# Patient Record
Sex: FEMALE | Race: WHITE | Employment: PART TIME | URBAN - METROPOLITAN AREA
[De-identification: names, ages, dates, MRNs, and addresses within clinical notes are randomized per-mention and may not be internally consistent; named-entity substitution may affect disease eponyms.]

---

## 2017-06-22 PROBLEM — I51.89 DIASTOLIC DYSFUNCTION: Status: ACTIVE | Noted: 2017-06-22

## 2017-06-22 PROBLEM — I10 ESSENTIAL HYPERTENSION: Status: ACTIVE | Noted: 2017-06-22

## 2017-06-22 PROBLEM — I25.10 CORONARY ARTERY DISEASE INVOLVING NATIVE CORONARY ARTERY OF NATIVE HEART WITHOUT ANGINA PECTORIS: Status: ACTIVE | Noted: 2017-06-22

## 2017-06-22 PROBLEM — R06.02 SHORTNESS OF BREATH: Status: ACTIVE | Noted: 2017-06-22

## 2017-07-06 PROBLEM — M79.604 PAIN IN BOTH LOWER EXTREMITIES: Status: ACTIVE | Noted: 2017-07-06

## 2017-07-06 PROBLEM — M79.605 PAIN IN BOTH LOWER EXTREMITIES: Status: ACTIVE | Noted: 2017-07-06

## 2017-07-10 ENCOUNTER — TELEPHONE (OUTPATIENT)
Dept: NEUROLOGY | Facility: CLINIC | Age: 67
End: 2017-07-10

## 2017-07-10 DIAGNOSIS — D32.9 MENINGIOMA (HCC): ICD-10-CM

## 2017-07-10 DIAGNOSIS — G43.009 MIGRAINE WITHOUT AURA AND WITHOUT STATUS MIGRAINOSUS, NOT INTRACTABLE: Primary | ICD-10-CM

## 2017-07-10 NOTE — TELEPHONE ENCOUNTER
Patient is having MRI Brain with and without contrast at [facility]. Was told she needed to have blood work done beforehand. Was unsure what she needed to complete and did not have the number she called.      Spoke with radiology, states that she

## 2017-07-13 NOTE — TELEPHONE ENCOUNTER
Patient called back on status of below. Informed her that orders were faxed, verbalized understanding.

## 2017-07-19 ENCOUNTER — TELEPHONE (OUTPATIENT)
Dept: NEUROLOGY | Facility: CLINIC | Age: 67
End: 2017-07-19

## 2017-08-01 ENCOUNTER — TELEPHONE (OUTPATIENT)
Dept: NEUROLOGY | Facility: CLINIC | Age: 67
End: 2017-08-01

## 2017-08-01 NOTE — TELEPHONE ENCOUNTER
Per Yanet Cote, they are unable to do MRI with contrast due to low GFR. Benitez Mayers not available to consult if she would want MRI done without contrast only.  Advised cancelling appointment today and we will check with Dr Frederick Mayers when she return

## 2017-08-02 ENCOUNTER — TELEPHONE (OUTPATIENT)
Dept: NEUROLOGY | Facility: CLINIC | Age: 67
End: 2017-08-02

## 2017-08-07 NOTE — TELEPHONE ENCOUNTER
Pt called back, stating she was in a bad place to take calls before. Re-explained Dr. Car Yarbrough conversation. Requested that she get the MRI films and bring them to the office to upload, so that doctor can compare the old and new MRIs side by side.   She

## 2017-08-07 NOTE — TELEPHONE ENCOUNTER
Per Dr. Angela Lara, negative study. No concerning findings. Left detailed message on VM (ok per HIPAA) relaying above results. Encouraged call back with any questions/ concerns. MRI report sent for scanning.

## 2017-08-07 NOTE — TELEPHONE ENCOUNTER
Pt is calling back with questions regarding MRI results. She states that her last MRI showed meningiomas, and she wanted to know if they had gotten any bigger. Was able to pull MRI results from scanning folder.   Reviewed with her that there was no mentio

## 2017-08-07 NOTE — TELEPHONE ENCOUNTER
Called the patient and told her about the MRI brain results that we received from [facility]. They did not mentioned anything about the calcified skull lesions.  Report says no acute intracranial abnormality, calcifications of falx cerebri and ch

## 2017-08-08 ENCOUNTER — TELEPHONE (OUTPATIENT)
Dept: NEUROLOGY | Facility: CLINIC | Age: 67
End: 2017-08-08

## 2017-08-08 NOTE — TELEPHONE ENCOUNTER
Note per Epic that patient spoke to this RN twice and Dr. Jayesh De Paz once yesterday. Called patient back and she states that she had a colonoscopy yesterday and she was Anne Bob out of it\" when she talked with us, and didn't remember the conversations.     R

## 2017-08-29 PROBLEM — I87.2 VENOUS INSUFFICIENCY (CHRONIC) (PERIPHERAL): Status: ACTIVE | Noted: 2017-08-29

## 2018-08-14 PROBLEM — Z01.818 PREOPERATIVE CLEARANCE: Status: ACTIVE | Noted: 2018-08-14

## 2020-09-26 ENCOUNTER — ORDER TRANSCRIPTION (OUTPATIENT)
Dept: ADMINISTRATIVE | Facility: HOSPITAL | Age: 70
End: 2020-09-26

## 2020-09-26 DIAGNOSIS — S52.532A CLOSED COLLES' FRACTURE OF LEFT RADIUS, INITIAL ENCOUNTER: Primary | ICD-10-CM

## 2020-11-02 ENCOUNTER — OFFICE VISIT (OUTPATIENT)
Dept: ELECTROPHYSIOLOGY | Facility: HOSPITAL | Age: 70
End: 2020-11-02
Attending: ORTHOPAEDIC SURGERY
Payer: MEDICARE

## 2020-11-02 DIAGNOSIS — S52.532A CLOSED COLLES' FRACTURE OF LEFT RADIUS, INITIAL ENCOUNTER: ICD-10-CM

## 2020-11-02 PROCEDURE — 95910 NRV CNDJ TEST 7-8 STUDIES: CPT | Performed by: OTHER

## 2020-11-02 PROCEDURE — 95886 MUSC TEST DONE W/N TEST COMP: CPT | Performed by: OTHER

## 2020-11-02 NOTE — PROCEDURES
[address] [place] [address]  [place], [place] [facility]  [phone]            Patient: Laurita Andrea Sex: Female  Patient ID: [id number] YOB: 1950      Visit Date: 11/2/2020 10:55  Patient Age On Visit Date Median - APB 25.63 26.25 ?32.00   Ulnar - ADM 25.89  ?32.00             Needle EMG (Upper limb)         EMG Summary Table     Spontaneous MUAP Recruitment   Muscle Nerve Roots IA Fib PSW Fasc H.F. Amp Dur. PPP Pattern   L.  Deltoid Axillary C5-C6 N None Non

## 2021-06-08 PROBLEM — F90.0 ATTENTION DEFICIT HYPERACTIVITY DISORDER (ADHD), PREDOMINANTLY INATTENTIVE TYPE: Status: ACTIVE | Noted: 2021-06-08

## (undated) NOTE — LETTER
8/10/2017        [address] [facility]      Dear Toni Plummer,     We are contacting you from Dr. Delgado Segura office. Your health is important to us.  We have not received test results for additional tests that your provider r

## (undated) NOTE — MR AVS SNAPSHOT
After Visit Summary   11/2/2020    Mendel Pleva    MRN: [id number]           Visit Information     Date & Time  11/2/2020 10:00 AM Provider  Harleen Orlando, [address] [place] [address] Neurodiagnostics Dept.  Phone  [phone] Vitamin D3 2000 UNITS Oral Cap Take 2,000 Units by mouth daily. Vitamin B-12 1000 MCG Oral Tab Take 1,000 mcg by mouth daily. Fluticasone Propionate 50 MCG/ACT Nasal Suspension 1 spray by Each Nare route daily as needed for Allergies.       Artif Treatment for mild illness or injury that does not require immediate attention.  Average cost  $70*   [facility]  Monday – Friday  8:00 am – 8:00 pm   Saturday – Sunday  8:00 am – 4:00 pm    [facility]  Primary Care